# Patient Record
Sex: MALE | Race: BLACK OR AFRICAN AMERICAN | NOT HISPANIC OR LATINO | URBAN - METROPOLITAN AREA
[De-identification: names, ages, dates, MRNs, and addresses within clinical notes are randomized per-mention and may not be internally consistent; named-entity substitution may affect disease eponyms.]

---

## 2017-02-07 ENCOUNTER — OFFICE VISIT - HEALTHEAST (OUTPATIENT)
Dept: CARDIOLOGY | Facility: CLINIC | Age: 82
End: 2017-02-07

## 2017-02-07 DIAGNOSIS — I25.10 CORONARY ARTERY DISEASE INVOLVING NATIVE CORONARY ARTERY OF NATIVE HEART WITHOUT ANGINA PECTORIS: ICD-10-CM

## 2017-02-07 DIAGNOSIS — I10 ESSENTIAL HYPERTENSION: ICD-10-CM

## 2017-02-07 DIAGNOSIS — I25.5 ISCHEMIC CARDIOMYOPATHY: ICD-10-CM

## 2017-02-07 DIAGNOSIS — I71.40 AAA (ABDOMINAL AORTIC ANEURYSM) (H): ICD-10-CM

## 2017-02-07 ASSESSMENT — MIFFLIN-ST. JEOR: SCORE: 1137.53

## 2018-01-01 ENCOUNTER — AMBULATORY - HEALTHEAST (OUTPATIENT)
Dept: CARDIOLOGY | Facility: CLINIC | Age: 83
End: 2018-01-01

## 2018-01-01 ENCOUNTER — HOME CARE/HOSPICE - HEALTHEAST (OUTPATIENT)
Dept: HOSPICE | Facility: HOSPICE | Age: 83
End: 2018-01-01

## 2018-01-01 ENCOUNTER — RECORDS - HEALTHEAST (OUTPATIENT)
Dept: ADMINISTRATIVE | Facility: OTHER | Age: 83
End: 2018-01-01

## 2018-01-01 ENCOUNTER — ANESTHESIA - HEALTHEAST (OUTPATIENT)
Dept: SURGERY | Facility: CLINIC | Age: 83
End: 2018-01-01

## 2018-01-01 ENCOUNTER — HOME CARE/HOSPICE - HEALTHEAST (OUTPATIENT)
Dept: HOME HEALTH SERVICES | Facility: HOME HEALTH | Age: 83
End: 2018-01-01

## 2018-01-01 ENCOUNTER — AMBULATORY - HEALTHEAST (OUTPATIENT)
Dept: HOSPICE | Facility: HOSPICE | Age: 83
End: 2018-01-01

## 2018-01-01 ENCOUNTER — SURGERY - HEALTHEAST (OUTPATIENT)
Dept: GASTROENTEROLOGY | Facility: CLINIC | Age: 83
End: 2018-01-01

## 2018-01-01 ENCOUNTER — COMMUNICATION - HEALTHEAST (OUTPATIENT)
Dept: ADMINISTRATIVE | Facility: CLINIC | Age: 83
End: 2018-01-01

## 2018-01-01 DIAGNOSIS — I25.10 CAD (CORONARY ARTERY DISEASE): ICD-10-CM

## 2018-01-01 RX ORDER — HYDROMORPHONE HYDROCHLORIDE 1 MG/ML
1-2 SOLUTION ORAL
Status: SHIPPED | COMMUNITY
Start: 2018-01-01

## 2018-01-01 RX ORDER — HALOPERIDOL 2 MG/ML
0.5-2 SOLUTION ORAL EVERY 4 HOURS PRN
Status: SHIPPED | COMMUNITY
Start: 2018-01-01

## 2018-01-01 ASSESSMENT — MIFFLIN-ST. JEOR
SCORE: 1205.57
SCORE: 1052.71

## 2018-12-10 ENCOUNTER — HOME CARE/HOSPICE - HEALTHEAST (OUTPATIENT)
Dept: HOSPICE | Facility: HOSPICE | Age: 83
End: 2018-12-10

## 2021-05-30 VITALS — BODY MASS INDEX: 16.97 KG/M2 | HEIGHT: 68 IN | WEIGHT: 112 LBS

## 2021-06-01 VITALS
HEIGHT: 67 IN | WEIGHT: 96.8 LBS | HEIGHT: 70 IN | WEIGHT: 120 LBS | BODY MASS INDEX: 17.22 KG/M2 | BODY MASS INDEX: 15.16 KG/M2 | HEIGHT: 67 IN | BODY MASS INDEX: 17.22 KG/M2 | WEIGHT: 100 LBS | BODY MASS INDEX: 13.89 KG/M2 | BODY MASS INDEX: 15.7 KG/M2

## 2021-06-01 VITALS — BODY MASS INDEX: 15.3 KG/M2 | WEIGHT: 100.6 LBS

## 2021-06-01 VITALS — WEIGHT: 100 LBS | BODY MASS INDEX: 15.2 KG/M2

## 2021-06-08 NOTE — PROGRESS NOTES
[facility] Progress Note    Assessment:    1.  Coronary artery disease with no anginal symptoms reported  2.  Moderate ischemic cardiomyopathy compensated  3.  Hypertension controlled  4.  Infrarenal abdominal aortic aneurysm stable    Plan:    Will discontinue the patient's Plavix and continue all other medicines as he is currently taking.  We'll arrange for a follow-up echocardiogram to be done one year from now to recheck his ejection fraction.  Patient was instructed to weigh himself on a daily basis and to continue to follow a no added salt diet    An After Visit Summary was printed and given to the patient.    Subjective:    86-year-old male who underwent two-vessel coronary artery stenting to his mid circumflex and first diagonal in January 2015.  He was noted to have a moderate underlying ischemic cardiomyopathy.  Patient was admitted with an acute coronary syndrome in September 2016 with elevated troponins present.  Coronary angiography done on September 15 revealed mild coronary artery stenosis with both stents to be widely patent.  Repeat echocardiogram done on September 15, 2016 continued to show a moderate underlying ischemic cardiomyopathy with anteroapical hypokinesis to akinesis.  Over the last of 4 months patient denies any recurrent chest pain.  He denies any symptoms of heart failure including orthopnea PND or weight gain    Problem List:    Patient Active Problem List   Diagnosis     Chest pain     Non Q wave myocardial infarction     Acute non Q wave MI (myocardial infarction), initial episode of care     NSTEMI (non-ST elevated myocardial infarction)     Insomnia     HTN (hypertension)     Dyslipidemia     Left ventricular systolic dysfunction without heart failure     CAD (coronary artery disease)     Tobacco abuse     Weakness     NSTEMI, initial episode of care     Acute blood loss anemia     Stress-induced cardiomyopathy     Malnutrition         Current Outpatient  "Prescriptions   Medication Sig Dispense Refill     aspirin 81 MG EC tablet Take 81 mg by mouth daily.       atorvastatin (LIPITOR) 40 MG tablet Take 1 tablet (40 mg total) by mouth daily. 30 tablet 0     clopidogrel (PLAVIX) 75 mg tablet Take 75 mg by mouth daily.       diphenhydrAMINE (SLEEP AID, DIPHENHYDRAMINE,) 25 mg capsule Take 25 mg by mouth bedtime as needed for itching.       metoprolol succinate (TOPROL-XL) 25 MG Take 25 mg by mouth daily.       nitroglycerin (NITROSTAT) 0.4 MG SL tablet Place 1 tablet (0.4 mg total) under the tongue every 5 (five) minutes as needed for chest pain. 30 tablet 0     traMADol (ULTRAM) 50 mg tablet Take 50 mg by mouth. prn       traZODone (DESYREL) 50 MG tablet Take 50 mg by mouth bedtime.       No current facility-administered medications for this visit.        .No past surgical history on file.    .  Social History     Social History     Marital status:      Spouse name: N/A     Number of children: N/A     Years of education: N/A     Occupational History     Not on file.     Social History Main Topics     Smoking status: Former Smoker     Packs/day: 1.00     Types: Cigarettes     Smokeless tobacco: Not on file     Alcohol use Yes      Comment: occasional     Drug use: No     Sexual activity: Not on file     Other Topics Concern     Not on file     Social History Narrative       .No family history on file.  Review of Systems:  General: WNL  Eyes: WNL  Ears/Nose/Throat: WNL  Lungs: Cough  Heart: Chest Pain  Stomach: WNL  Bladder: WNL  Muscle/Joints: WNL  Skin: WNL  Nervous System: WNL  Mental Health: WNL     Blood: WNL    Objective:     Visit Vitals     /76 (Patient Site: Right Arm, Patient Position: Sitting, Cuff Size: Adult Regular)     Pulse 60     Resp 18     Ht 5' 8\" (1.727 m)     Wt 112 lb (50.8 kg)     BMI 17.03 kg/m2     112 lb (50.8 kg)   [unfilled]    Physical Exam:    GENERAL APPEARANCE: alert, no apparent distress  HEENT: no scleral icterus or " xanthelasma  NECK: jugular venous pressure normal  CHEST: symmetric, the lungs were clear to auscultation  CARDIOVASCULAR: regular rhythm without murmur, click, or gallop; no carotid bruits  ABDOMEN: nondistended, nontender, bowel sounds present  EXTREMITIES: no cyanosis, clubbing or edema.    Cardiac Testing:    echocardiogram from September 15, 2016 results reviewed as above      Coronary angiography September 15, 2016 results reviewed as above      Lab Results:    LIPIDS:  Lab Results   Component Value Date    CHOL 144 09/15/2016    CHOL 179 01/15/2015     Lab Results   Component Value Date    HDL 53 09/15/2016    HDL 62 01/15/2015     Lab Results   Component Value Date    LDLCALC 81 09/15/2016    LDLCALC 108 01/15/2015     Lab Results   Component Value Date    TRIG 48 09/15/2016    TRIG 47 01/15/2015     No components found for: CHOLHDL    BMP:  Lab Results   Component Value Date    CREATININE 0.71 09/15/2016    BUN 7 (L) 09/15/2016     09/15/2016    K 3.8 09/16/2016     09/15/2016    CO2 23 09/15/2016         Ovidio Huertas MD,F.A.C.C.  [facility] [place] [facility]  [phone]

## 2021-06-16 PROBLEM — K56.2 VOLVULUS (H): Status: ACTIVE | Noted: 2018-01-01

## 2021-06-16 PROBLEM — J69.0 ASPIRATION PNEUMONIA OF RIGHT LOWER LOBE (H): Status: ACTIVE | Noted: 2018-01-01

## 2021-06-16 PROBLEM — J96.01 ACUTE RESPIRATORY FAILURE WITH HYPOXIA (H): Status: ACTIVE | Noted: 2018-01-01

## 2021-06-16 PROBLEM — R13.12 OROPHARYNGEAL DYSPHAGIA: Status: ACTIVE | Noted: 2018-01-01

## 2021-08-03 PROBLEM — A41.9 SEPSIS (H): Status: RESOLVED | Noted: 2018-01-01 | Resolved: 2018-01-01
